# Patient Record
Sex: FEMALE | Race: OTHER
[De-identification: names, ages, dates, MRNs, and addresses within clinical notes are randomized per-mention and may not be internally consistent; named-entity substitution may affect disease eponyms.]

---

## 2019-11-27 ENCOUNTER — APPOINTMENT (OUTPATIENT)
Dept: PULMONOLOGY | Facility: CLINIC | Age: 29
End: 2019-11-27
Payer: MEDICARE

## 2019-11-27 VITALS
OXYGEN SATURATION: 100 % | DIASTOLIC BLOOD PRESSURE: 80 MMHG | RESPIRATION RATE: 16 BRPM | HEIGHT: 63 IN | HEART RATE: 76 BPM | BODY MASS INDEX: 33.13 KG/M2 | SYSTOLIC BLOOD PRESSURE: 123 MMHG | WEIGHT: 187 LBS

## 2019-11-27 PROCEDURE — 99203 OFFICE O/P NEW LOW 30 MIN: CPT

## 2019-11-27 NOTE — REVIEW OF SYSTEMS
[Fever] : no fever [Nasal Congestion] : nasal congestion [Sinus Problems] : no sinus problems [Cough] : no cough [Dyspnea] : no dyspnea [Hypertension] : ~T hypertension [Heartburn] : no heartburn [Reflux] : no reflux

## 2019-11-27 NOTE — HISTORY OF PRESENT ILLNESS
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  She was titrated to Bilevel 20/16 using Resmed P10 nasal pillows that alleviated snoring and apneas.  She presents for evaluation prior to initiating BIPAP therapy.\par \par She has a history of cognitive deficits.  She reports snoring daytime somnolence and unrestful sleep.  Her mother is present and showed me a video of her daughter sleeping in  which apneas and snoring was evident.\par \par \par PMH\par \par HTN\par \par elevated BMI\par \par disabled due to cognitive deficits\par \par \par PSH\par \par eye surgery several times due to strabismus\par \par HTN\par \par ALLERGY:  NKDA\par \par SH\par \par never smoked\par \par

## 2019-11-27 NOTE — DISCUSSION/SUMMARY
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, elevated BMI, some kyphosis and increased neck circumference.\par \par Sleep study with only titration portion was provided \par \par BIPAP 20/16 through nasal pillows recommended\par \par I am concerned that she will not tolerate such high BIPAP pressures\par \par PLAN\par check [place] CT sinus or head to evaluate for nasal polyps, septal deviation or other  correctable conditions that could enable use of lower pressures\par \par For now, will order BIPAP and observe\par \par refer to ENT for evaluation after sinus CT\par \par consider alternate mask, if appropriate seal can be attained\par \par I will need to obtain initial diagnostic sleep study results.\par \par Bobby Pickard MD [facility] \par \par

## 2019-11-27 NOTE — PHYSICAL EXAM
[Enlarged Base of the Tongue] : enlargement of the base of the tongue [Retrognathia] : retrognathia [Micrognathia] : micrognathia [Heart Rate And Rhythm] : heart rate and rhythm were normal [Heart Sounds] : normal S1 and S2 [Murmurs] : no murmurs present [Arterial Pulses Normal] : the arterial pulses were normal [Respiration, Rhythm And Depth] : normal respiratory rhythm and effort [Exaggerated Use Of Accessory Muscles For Inspiration] : no accessory muscle use [Auscultation Breath Sounds / Voice Sounds] : lungs were clear to auscultation bilaterally [Bowel Sounds] : normal bowel sounds [Abdomen Soft] : soft [Abdomen Tenderness] : non-tender [] : no hepato-splenomegaly [Nail Clubbing] : no clubbing of the fingernails [Motor Exam] : the motor exam was normal [Affect] : the affect was normal [Mood] : the mood was normal [Normal Oropharynx] : abnormal oropharynx [Low Lying Soft Palate] : no low lying soft palate [Elongated Uvula] : no elongated uvula [FreeTextEntry1] : no edema

## 2019-12-12 ENCOUNTER — APPOINTMENT (OUTPATIENT)
Dept: OTOLARYNGOLOGY | Facility: CLINIC | Age: 29
End: 2019-12-12
Payer: MEDICARE

## 2019-12-12 VITALS
BODY MASS INDEX: 32.43 KG/M2 | SYSTOLIC BLOOD PRESSURE: 135 MMHG | HEIGHT: 63 IN | DIASTOLIC BLOOD PRESSURE: 84 MMHG | WEIGHT: 183 LBS | HEART RATE: 75 BPM

## 2019-12-12 DIAGNOSIS — H61.23 IMPACTED CERUMEN, BILATERAL: ICD-10-CM

## 2019-12-12 PROCEDURE — 99204 OFFICE O/P NEW MOD 45 MIN: CPT | Mod: 25

## 2019-12-12 PROCEDURE — 69210 REMOVE IMPACTED EAR WAX UNI: CPT

## 2019-12-12 PROCEDURE — 31575 DIAGNOSTIC LARYNGOSCOPY: CPT

## 2019-12-12 RX ORDER — BRIMONIDINE TARTRATE, TIMOLOL MALEATE 2; 5 MG/ML; MG/ML
SOLUTION/ DROPS OPHTHALMIC
Refills: 0 | Status: ACTIVE | COMMUNITY

## 2019-12-12 RX ORDER — BIMATOPROST 0.1 MG/ML
SOLUTION/ DROPS OPHTHALMIC
Refills: 0 | Status: ACTIVE | COMMUNITY

## 2019-12-12 RX ORDER — LOSARTAN POTASSIUM 50 MG/1
50 TABLET, FILM COATED ORAL
Refills: 0 | Status: ACTIVE | COMMUNITY

## 2019-12-12 RX ORDER — NETARSUDIL 0.2 MG/ML
SOLUTION/ DROPS OPHTHALMIC; TOPICAL
Refills: 0 | Status: ACTIVE | COMMUNITY

## 2019-12-12 NOTE — REASON FOR VISIT
[Initial Consultation] : an initial consultation for [Parent] : parent [FreeTextEntry2] : referred by Dr. Bobby Pickard, Pulmonologist, for obstructive sleep apnea

## 2019-12-12 NOTE — HISTORY OF PRESENT ILLNESS
[de-identified] : 29 year old female, mentally disabled, referred by Dr. Bobby Pickard, Pulmonologist, for obstructive sleep apnea  Sleep study conducted 9/28/19 AHI 79.8.  Mother states she has not yet started using a CPAP machine.   Denies nasal congestion, sinus pain/pressure, post nasal drip, nasal discharge.  Denies sinus infections in the past year.

## 2019-12-12 NOTE — CONSULT LETTER
[Consult Letter:] : I had the pleasure of evaluating your patient, [unfilled]. [Dear  ___] : Dear  [unfilled], [Please see my note below.] : Please see my note below. [Sincerely,] : Sincerely, [Consult Closing:] : Thank you very much for allowing me to participate in the care of this patient.  If you have any questions, please do not hesitate to contact me. [FreeTextEntry3] : Han Mason MD, FACS \par  of Otolaryngology  \par [facility] \par [address] \[place] [address] \par Phone: [phone] \par Fax: [phone] \par \par

## 2019-12-12 NOTE — PROCEDURE
[Cerumen Impaction] : Cerumen Impaction [de-identified] : Fiberoptic Laryngoscopy (23171)\par \par Procedure performed: Fiberoptic Laryngeal Endoscopy - Diagnostic\par Pre-op/post op indication: gladys\par Patient was unable to cooperate with mirror. After informed verbal consent is obtained, the fiberoptic nasal endoscope # []  is passed via the right and left nasal cavity. \par Findings: base of tongue large retroflexed epiglottis, bilateral vocal fold mobility full and symmetric. There was insignificant arytenoids edema and  erythema seen , without  mass or lesions..Nasopharynx open , tonsils not obstructive.\par  [FreeTextEntry6] : Cerumen was removed from both ears under binocular microscopy with a combination of a suction and/or a loop curette. The patient tolerated the procedure well and there were no complications. The  findings are noted above.\par  [] : Removal of Cerumen [Same] : same as the Pre Op Dx.

## 2020-01-08 ENCOUNTER — APPOINTMENT (OUTPATIENT)
Dept: PULMONOLOGY | Facility: CLINIC | Age: 30
End: 2020-01-08
Payer: MEDICARE

## 2020-01-08 VITALS
SYSTOLIC BLOOD PRESSURE: 122 MMHG | WEIGHT: 192 LBS | DIASTOLIC BLOOD PRESSURE: 70 MMHG | HEART RATE: 80 BPM | OXYGEN SATURATION: 99 % | BODY MASS INDEX: 34.01 KG/M2 | TEMPERATURE: 97.2 F

## 2020-01-08 PROCEDURE — 99214 OFFICE O/P EST MOD 30 MIN: CPT

## 2020-01-12 NOTE — REVIEW OF SYSTEMS
[Fever] : no fever [Nasal Congestion] : nasal congestion [Sinus Problems] : no sinus problems [Dry Mouth] : no dry mouth [Cough] : no cough [Dyspnea] : no dyspnea [Hypertension] : ~T hypertension [Heartburn] : no heartburn [Reflux] : no reflux

## 2020-01-12 NOTE — HISTORY OF PRESENT ILLNESS
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  She was titrated to Bilevel 20/16 using Resmed P10 nasal pillows that alleviated snoring and apneas. \par \par She underwent CT brain, sinuses normal.  ENT evaluation noted\par \par \par Sheis using BIlevel via nasal mask, Airfit N20 nasal.\par \par She uses BIPAP most of night approximately 7 hours, and notes much benefit\par \par She has a history of cognitive deficits.  She reports snoring daytime somnolence and unrestful sleep.  \par \par PMH\par \par HTN\par \par elevated BMI\par \par disabled due to cognitive deficits\par \par \par PSH\par \par eye surgery several times due to strabismus\par \par HTN\par \par ALLERGY:  NKDA\par \par SH\par \par never smoked\par \par

## 2020-01-12 NOTE — DISCUSSION/SUMMARY
[FreeTextEntry1] : JAIME:  continue to use BILEVEL via nasal mask\par \par she declines influenza vaccine\par \par Requests letter for airline explaining that she requires MASK and device to be carried on to plane. \par \par Bobby Pickard MD [facility]

## 2020-01-12 NOTE — PHYSICAL EXAM
[Enlarged Base of the Tongue] : enlargement of the base of the tongue [Retrognathia] : retrognathia [Micrognathia] : micrognathia [Heart Rate And Rhythm] : heart rate and rhythm were normal [Arterial Pulses Normal] : the arterial pulses were normal [Heart Sounds] : normal S1 and S2 [Murmurs] : no murmurs present [Exaggerated Use Of Accessory Muscles For Inspiration] : no accessory muscle use [Auscultation Breath Sounds / Voice Sounds] : lungs were clear to auscultation bilaterally [Respiration, Rhythm And Depth] : normal respiratory rhythm and effort [Abdomen Tenderness] : non-tender [Bowel Sounds] : normal bowel sounds [Abdomen Soft] : soft [] : no hepato-splenomegaly [Motor Exam] : the motor exam was normal [Affect] : the affect was normal [Nail Clubbing] : no clubbing of the fingernails [Mood] : the mood was normal [Normal Oropharynx] : abnormal oropharynx [Low Lying Soft Palate] : no low lying soft palate [Elongated Uvula] : no elongated uvula [FreeTextEntry1] : no edema

## 2020-05-27 ENCOUNTER — APPOINTMENT (OUTPATIENT)
Dept: PULMONOLOGY | Facility: CLINIC | Age: 30
End: 2020-05-27
Payer: MEDICARE

## 2020-05-27 VITALS
TEMPERATURE: 98.3 F | OXYGEN SATURATION: 100 % | BODY MASS INDEX: 31.53 KG/M2 | HEART RATE: 79 BPM | DIASTOLIC BLOOD PRESSURE: 80 MMHG | SYSTOLIC BLOOD PRESSURE: 110 MMHG | WEIGHT: 178 LBS

## 2020-05-27 PROCEDURE — 99214 OFFICE O/P EST MOD 30 MIN: CPT

## 2020-05-27 NOTE — PHYSICAL EXAM
[III] : Mallampati Class: III [No Neck Mass] : no neck mass [No Murmurs] : no murmurs [Normal S1, S2] : normal s1, s2 [Normal Rate/Rhythm] : normal rate/rhythm [Clear to Auscultation Bilaterally] : clear to auscultation bilaterally [No Resp Distress] : no resp distress [No Clubbing] : no clubbing [Benign] : benign [Oriented x3] : oriented x3 [No Edema] : no edema

## 2020-05-27 NOTE — HISTORY OF PRESENT ILLNESS
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 She was titrated to Bilevel 20/16 using Resmed P10 nasal pillows that alleviated snoring and apneas. \par \par She underwent CT brain, sinuses normal. ENT evaluation noted\par \par \par She is using BIlevel via nasal mask, She has switched to the [employer] Nuance mask rather than the Airfit N20 nasal as the latter had caused  an abrasion on side of her nose. Masks are comparable. \par \par She uses BIPAP most of night and notes much benefit\par \par She has a history of cognitive deficits. She reports snoring daytime somnolence and unrestful sleep. \par \par PMH\par \par HTN\par \par elevated BMI\par \par disabled due to cognitive deficits\par \par \par PSH\par \par eye surgery several times due to strabismus\par \par HTN\par \par ALLERGY: NKDA\par \par SH\par \par never smoked\par \par t\par \par

## 2020-05-27 NOTE — DISCUSSION/SUMMARY
[FreeTextEntry1] : continue BIPAP at current setting with nasal mask of her choice\par \par she declines influenza vaccine\par \par \par \par Bobby Pickard MD [facility]

## 2020-05-27 NOTE — REVIEW OF SYSTEMS
[Cough] : no cough [Nasal Congestion] : nasal congestion [Hay Fever] : no hay fever [Dyspnea] : no dyspnea

## 2020-11-18 ENCOUNTER — APPOINTMENT (OUTPATIENT)
Dept: PULMONOLOGY | Facility: CLINIC | Age: 30
End: 2020-11-18
Payer: MEDICARE

## 2020-11-18 VITALS
RESPIRATION RATE: 17 BRPM | WEIGHT: 168 LBS | HEIGHT: 63 IN | SYSTOLIC BLOOD PRESSURE: 108 MMHG | DIASTOLIC BLOOD PRESSURE: 80 MMHG | HEART RATE: 97 BPM | TEMPERATURE: 97 F | OXYGEN SATURATION: 100 % | BODY MASS INDEX: 29.77 KG/M2

## 2020-11-18 PROCEDURE — 99214 OFFICE O/P EST MOD 30 MIN: CPT

## 2020-11-18 NOTE — PHYSICAL EXAM
[III] : Mallampati Class: III [No Neck Mass] : no neck mass [Normal Rate/Rhythm] : normal rate/rhythm [Normal S1, S2] : normal s1, s2 [No Murmurs] : no murmurs [No Resp Distress] : no resp distress [Clear to Auscultation Bilaterally] : clear to auscultation bilaterally [Benign] : benign [No Clubbing] : no clubbing [No Edema] : no edema [Oriented x3] : oriented x3

## 2020-11-19 NOTE — HISTORY OF PRESENT ILLNESS
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 She was titrated to Bilevel 20/16 using Resmed P10 nasal pillows that alleviated snoring and apneas. \par \par She underwent CT brain, sinuses normal. ENT evaluation noted\par \par \par She is using BIlevel via nasal mask, She has switched to the [employer]iki Nuance mask rather than the Airfit N20 nasal as the latter had caused  an abrasion on side of her nose. Masks are comparable. \par \par She uses BIPAP most of night and notes much benefit.  She is tolerating it well.  She has lost weight and feels better.\par \par \par She has had the influenza vaccine this season in September\par \par PMH\par \par HTN\par \par elevated BMI\par \par disabled due to cognitive deficits\par \par \par PSH\par \par eye surgery several times due to strabismus\par \par HTN\par \par ALLERGY: NKDA\par \par SH\par \par never smoked\par \par \par \par \par \par

## 2020-11-19 NOTE — REVIEW OF SYSTEMS
[Nasal Congestion] : nasal congestion [Cough] : no cough [Dyspnea] : no dyspnea [Hay Fever] : no hay fever

## 2020-11-19 NOTE — DISCUSSION/SUMMARY
[FreeTextEntry1] : JAIME\par \par continue Bilevel PAP therapy via nasal prongs\par \par she had flu vaccine\par \par consider pneumonia vaccine\par \par may consider retitration if continues to lose weight\par \par [facility]

## 2021-05-12 ENCOUNTER — APPOINTMENT (OUTPATIENT)
Dept: PULMONOLOGY | Facility: CLINIC | Age: 31
End: 2021-05-12
Payer: MEDICARE

## 2021-05-12 VITALS
WEIGHT: 172 LBS | BODY MASS INDEX: 30.47 KG/M2 | OXYGEN SATURATION: 99 % | SYSTOLIC BLOOD PRESSURE: 120 MMHG | TEMPERATURE: 99.5 F | HEART RATE: 76 BPM | DIASTOLIC BLOOD PRESSURE: 90 MMHG

## 2021-05-12 PROCEDURE — 99213 OFFICE O/P EST LOW 20 MIN: CPT

## 2021-05-13 NOTE — DISCUSSION/SUMMARY
[FreeTextEntry1] : JAIME\par \par continue Bilevel PAP therapy via nasal mask \par \par weight has been stable\par \par she had flu vaccine\par \par consider pneumonia vaccine, next visit after COVID vaccine\par \par Bobby Pickard MD \par

## 2021-05-13 NOTE — HISTORY OF PRESENT ILLNESS
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 She was titrated to Bilevel 20/16 using Resmed P10 nasal pillows that alleviated snoring and apneas. \par \par She underwent CT brain, sinuses normal. ENT evaluation noted\par \par \par She is using BIlevel via nasal mask, She has switched to the Tiki Nuance mask rather than the Airfit N20 nasal as the latter had caused  an abrasion on side of her nose. Masks are comparable. \par \par She uses BIPAP most of night and notes much benefit.  She is tolerating it well.  She has lost weight and feels better.\par \par She has some issues with leak, when patient moves.  \par \par She allen use mask consistently every night, at least 4 hours\par \par She has had the influenza vaccine this season in September\par \par PMH\par \par HTN\par \par elevated BMI\par \par disabled due to cognitive deficits\par \par kidney problem? \par \par history of hematuria, proteinuria, now stable.\par PSH\par \par eye surgery several times due to strabismus\par \par \par \par ALLERGY: NKDA\par \par SH\par \par never smoked\par \par \par \par \par \par  [Obstructive Sleep Apnea] : obstructive sleep apnea

## 2021-09-15 ENCOUNTER — APPOINTMENT (OUTPATIENT)
Dept: PULMONOLOGY | Facility: CLINIC | Age: 31
End: 2021-09-15
Payer: MEDICARE

## 2021-09-15 VITALS
OXYGEN SATURATION: 98 % | TEMPERATURE: 98.6 F | DIASTOLIC BLOOD PRESSURE: 82 MMHG | BODY MASS INDEX: 30.47 KG/M2 | WEIGHT: 172 LBS | SYSTOLIC BLOOD PRESSURE: 116 MMHG | HEART RATE: 98 BPM

## 2021-09-15 PROCEDURE — 99213 OFFICE O/P EST LOW 20 MIN: CPT

## 2021-09-16 NOTE — HISTORY OF PRESENT ILLNESS
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 She was titrated to Bilevel 20/16 using Resmed P10 nasal pillows that alleviated snoring and apneas. \par \par She underwent CT brain, sinuses normal. ENT evaluation noted\par \par \par She is using BIlevel via nasal mask, She has switched to the Tiki Nuance mask rather than the Airfit N20 nasal as the latter had caused  an abrasion on side of her nose. Masks are comparable. \par \par She uses BIPAP most of night and notes much benefit.  She is tolerating it well.  She has lost weight and feels better.\par \par She has some issues with leak, when patient moves.  \par \par She allen use mask consistently every night, at least 4 hours with benefit.\par \par \par \par \par \par PMH\par \par HTN\par \par elevated BMI\par \par disabled due to cognitive deficits\par \par kidney problem? \par \par history of hematuria, proteinuria, now stable.\par PSH\par \par eye surgery several times due to strabismus\par \par \par \par ALLERGY: NKDA\par \par SH\par \par never smoked\par \par \par \par \par \par

## 2021-09-16 NOTE — DISCUSSION/SUMMARY
[FreeTextEntry1] : JAIME\par \par continue Bilevel PAP therapy via nasal mask \par \par weight has been stable\par \par She has had COVID vaccine\par \par may consider pneumonia vaccine\par Pt is hesitant\par \par Bobby Pickard MD \par

## 2022-01-12 ENCOUNTER — APPOINTMENT (OUTPATIENT)
Dept: PULMONOLOGY | Facility: CLINIC | Age: 32
End: 2022-01-12
Payer: MEDICARE

## 2022-01-12 VITALS
TEMPERATURE: 98.7 F | BODY MASS INDEX: 31.89 KG/M2 | HEART RATE: 101 BPM | WEIGHT: 180 LBS | SYSTOLIC BLOOD PRESSURE: 138 MMHG | DIASTOLIC BLOOD PRESSURE: 82 MMHG | OXYGEN SATURATION: 99 %

## 2022-01-12 PROCEDURE — 99214 OFFICE O/P EST MOD 30 MIN: CPT

## 2022-01-14 NOTE — HISTORY OF PRESENT ILLNESS
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 She was titrated to Bilevel 20/16 using Resmed P10 nasal pillows that alleviated snoring and apneas. \par \par She underwent CT brain, sinuses normal. ENT evaluation noted\par \par \par She is using BIlevel via nasal mask, She has switched to the Tiki Nuance mask rather than the Airfit N20 nasal as the latter had caused  an abrasion on side of her nose. Masks are comparable. \par \par She uses BIPAP most of night and notes much benefit.  She is tolerating it well.  She has lost weight and feels better.\par \par She has some issues with leak, when patient moves.  \par \par She does use mask consistently every night, at least 4 hours with benefit.\par \par Her BIPAP has been recalled.  She has severe JAIME and is dependent on the CPAP device.  She continues to use. It has been difficult to obtain new machine as most are sold out.\par \par \par PMH\par \par HTN\par \par elevated BMI\par \par disabled due to cognitive deficits\par \par kidney problem? \par \par history of hematuria, proteinuria, now stable.\par PSH\par \par eye surgery several times due to strabismus\par \par \par \par ALLERGY: NKDA\par \par SH\par \par never smoked\par \par \par \par \par \par

## 2022-01-14 NOTE — DISCUSSION/SUMMARY
[FreeTextEntry1] : JAIME\par \par continue Bilevel PAP therapy via nasal mask \par \par I will try to expedite replacement though options are limited . Option is to buy online if family can afford.  Again devices are limited and mostly out of stock.\par \par The patient has had the influenza vaccine this season. \par \par \par \par She has had COVID vaccine\par \par may consider pneumonia vaccine\par Pt is hesitant\par \par Bobby Pickard MD \par

## 2022-04-13 ENCOUNTER — APPOINTMENT (OUTPATIENT)
Dept: PULMONOLOGY | Facility: CLINIC | Age: 32
End: 2022-04-13
Payer: MEDICARE

## 2022-04-13 VITALS
SYSTOLIC BLOOD PRESSURE: 132 MMHG | OXYGEN SATURATION: 97 % | BODY MASS INDEX: 32.24 KG/M2 | DIASTOLIC BLOOD PRESSURE: 88 MMHG | HEART RATE: 83 BPM | WEIGHT: 182 LBS | TEMPERATURE: 98 F

## 2022-04-13 PROCEDURE — 99213 OFFICE O/P EST LOW 20 MIN: CPT

## 2022-04-17 NOTE — HISTORY OF PRESENT ILLNESS
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 She was titrated to Bilevel 20/16 using Resmed P10 nasal pillows that alleviated snoring and apneas. \par \par She underwent CT brain, sinuses normal. ENT evaluation noted\par \par \par She is using BIlevel via nasal mask, She has switched to the Tiki Nuance mask rather than the Airfit N20 nasal as the latter had caused  an abrasion on side of her nose. Masks are comparable. \par \par She uses BIPAP most of night and notes much benefit.  She is tolerating it well.  She has lost weight and feels better.\par \par She has some issues with leak, when patient moves.  \par \par She does use mask consistently every night, at least 4 hours with benefit.\par \par Her BIPAP has been recalled.  She has severe JAIME and is dependent on the CPAP device.  She continues to use. It has been difficult to obtain new machine as most are sold out.\par \par She continues to use BIPAP despite recall, has not been able to obtain new device\par \par \par \par \par PMH\par \par HTN\par \par elevated BMI\par \par disabled due to cognitive deficits\par \par kidney problem? \par \par history of hematuria, proteinuria, now stable.\par PSH\par \par eye surgery several times due to strabismus\par \par \par \par ALLERGY: NKDA\par \par SH\par \par never smoked\par \par \par \par \par \par

## 2022-04-17 NOTE — DISCUSSION/SUMMARY
[FreeTextEntry1] : JAIME\par \par continue Bilevel PAP therapy via nasal mask \par \par I will try to expedite replacement though options are limited . Option is to buy online if family can afford.  Again devices are limited and mostly out of stock.\par \par The patient has had the influenza vaccine this season. \par \par Pt's mother has inquired about rom study\par \par She will look into it on web site\par \par \par She has had COVID vaccine\par \par may consider pneumonia vaccine\par Pt is hesitant\par \par Bobby Pickard MD \par

## 2022-10-12 ENCOUNTER — APPOINTMENT (OUTPATIENT)
Dept: PULMONOLOGY | Facility: CLINIC | Age: 32
End: 2022-10-12

## 2022-10-12 VITALS
HEART RATE: 94 BPM | BODY MASS INDEX: 33.13 KG/M2 | TEMPERATURE: 98 F | DIASTOLIC BLOOD PRESSURE: 82 MMHG | OXYGEN SATURATION: 99 % | SYSTOLIC BLOOD PRESSURE: 116 MMHG | WEIGHT: 187 LBS

## 2022-10-12 DIAGNOSIS — Z23 ENCOUNTER FOR IMMUNIZATION: ICD-10-CM

## 2022-10-12 PROCEDURE — 90732 PPSV23 VACC 2 YRS+ SUBQ/IM: CPT

## 2022-10-12 PROCEDURE — G0009: CPT

## 2022-10-12 PROCEDURE — 99214 OFFICE O/P EST MOD 30 MIN: CPT | Mod: 25

## 2022-10-13 PROBLEM — Z23 ENCOUNTER FOR IMMUNIZATION: Status: ACTIVE | Noted: 2020-11-19

## 2022-10-13 NOTE — DISCUSSION/SUMMARY
[FreeTextEntry1] : JAIME\par \par continue Bilevel PAP therapy via nasal mask \par \par I will try to expedite replacement though options are limited . I will send prescription to other supplier in hopes of obtaining new equipment sooner\par \par The patient has had the influenza vaccine this season. \par \par She has had COVID vaccine\par \par I have administered the pneumonia vaccine today\par \par \par Total time spent : 30 minutes\par Including:\par Preparation prior to visit - Reviewing prior record, results of tests and Consultation Reports as applicable\par Conducting an appropriate H & P during today's encounter\par Appropriate orders for tests, medications and procedures, as applicable\par Counseling patient \par Note completion \par \par Bobby Pickard MD \par

## 2022-10-13 NOTE — HISTORY OF PRESENT ILLNESS
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 She was titrated to Bilevel 20/16 using Resmed P10 nasal pillows that alleviated snoring and apneas. \par \par She underwent CT brain, sinuses normal. ENT evaluation noted\par \par \par She is using BIlevel via nasal mask, She has switched to the Tiki Nuance mask rather than the Airfit N20 nasal as the latter had caused  an abrasion on side of her nose. Masks are comparable. \par \par She uses BIPAP most of night and notes much benefit.  She is tolerating it well.  She has lost weight and feels better.\par \par She has some issues with leak, when patient moves.  \par \par She does use mask consistently every night, at least 4 hours with benefit.\par \par Her BIPAP has been recalled.  She has severe JAIME and is dependent on the CPAP device.  She continues to use it. It has been difficult to obtain new machine as most are sold out.\par \par She continues to use BIPAP despite recall, has not been able to obtain new device\par \par She has not yet received replacement from [facility]. \par \par She denies dyspnea, cough, wheeze.  She gains benefit from CPAP.\par \par The patient has had the influenza vaccine this season. \par \par \par PMH\par \par HTN\par \par elevated BMI\par \par disabled due to cognitive deficits\par \par kidney problem? \par \par history of hematuria, proteinuria, now stable.\par PSH\par \par eye surgery several times due to strabismus\par \par \par \par ALLERGY: NKDA\par \par SH\par \par never smoked\par \par \par \par \par \par

## 2022-10-17 ENCOUNTER — MED ADMIN CHARGE (OUTPATIENT)
Age: 32
End: 2022-10-17

## 2023-02-15 ENCOUNTER — APPOINTMENT (OUTPATIENT)
Dept: PULMONOLOGY | Facility: CLINIC | Age: 33
End: 2023-02-15
Payer: MEDICARE

## 2023-02-15 VITALS
WEIGHT: 186 LBS | SYSTOLIC BLOOD PRESSURE: 122 MMHG | TEMPERATURE: 98.6 F | HEART RATE: 83 BPM | DIASTOLIC BLOOD PRESSURE: 84 MMHG | OXYGEN SATURATION: 99 % | BODY MASS INDEX: 32.95 KG/M2

## 2023-02-15 PROCEDURE — 99215 OFFICE O/P EST HI 40 MIN: CPT

## 2023-02-18 NOTE — DISCUSSION/SUMMARY
[FreeTextEntry1] : JAIME\par \par continue Bilevel PAP therapy via nasal mask \par \par I will try to expedite replacement though options are limited . Option is to buy online if family can afford.  Again devices are limited and mostly out of stock.\par \par I have called her supplier and requested equipmet\par \par The patient has had the influenza vaccine this season. \par \par Pt's mother has inquired about rom study\par \par She will look into it on web site\par \par \par She has had COVID vaccine\par \par may consider pneumonia vaccine\par Pt is hesitant\par \par Bobby Pickard MD \par

## 2023-02-18 NOTE — HISTORY OF PRESENT ILLNESS
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 She was titrated to Bilevel 20/16 using Resmed P10 nasal pillows that alleviated snoring and apneas. \par \par She underwent CT brain, sinuses normal. ENT evaluation noted\par \par \par She is using BIlevel via nasal mask, She has switched to the Tiki Nuance mask rather than the Airfit N20 nasal as the latter had caused  an abrasion on side of her nose. Masks are comparable. \par \par She uses BIPAP most of night and notes much benefit.  She is tolerating it well.  She has lost weight and feels better.\par \par She has some issues with leak, when patient moves.  \par \par She does use mask consistently every night, at least 4 hours with benefit.\par \par Her BIPAP has been recalled.  She has severe JAIME and is dependent on the CPAP device.  She continues to use. It has been difficult to obtain new machine as most are sold out.\par \par She continues to use BIPAP despite recall, has not been able to obtain new device\par \par Today she informs me that she has yet to receive replacement.  they have contacted [facility]\par \par \par PMH\par \par HTN\par \par elevated BMI\par \par disabled due to cognitive deficits\par \par kidney problem? \par \par history of hematuria, proteinuria, now stable.\par PSH\par \par eye surgery several times due to strabismus\par \par \par \par ALLERGY: NKDA\par \par SH\par \par never smoked\par \par \par \par \par \par

## 2023-06-14 ENCOUNTER — APPOINTMENT (OUTPATIENT)
Dept: PULMONOLOGY | Facility: CLINIC | Age: 33
End: 2023-06-14
Payer: MEDICARE

## 2023-06-14 VITALS
BODY MASS INDEX: 33.3 KG/M2 | SYSTOLIC BLOOD PRESSURE: 126 MMHG | HEART RATE: 99 BPM | DIASTOLIC BLOOD PRESSURE: 72 MMHG | OXYGEN SATURATION: 99 % | WEIGHT: 188 LBS | TEMPERATURE: 97.1 F

## 2023-06-14 DIAGNOSIS — Z00.00 ENCOUNTER FOR GENERAL ADULT MEDICAL EXAMINATION W/OUT ABNORMAL FINDINGS: ICD-10-CM

## 2023-06-14 PROCEDURE — 99214 OFFICE O/P EST MOD 30 MIN: CPT

## 2023-06-14 NOTE — PHYSICAL EXAM
[III] : Mallampati Class: III [No Neck Mass] : no neck mass [Normal Rate/Rhythm] : normal rate/rhythm [Normal S1, S2] : normal s1, s2 [No Murmurs] : no murmurs [No Resp Distress] : no resp distress [Clear to Auscultation Bilaterally] : clear to auscultation bilaterally [Benign] : benign [No Edema] : no edema [No Clubbing] : no clubbing [Oriented x3] : oriented x3

## 2023-06-14 NOTE — HISTORY OF PRESENT ILLNESS
[Never] : never [Obstructive Sleep Apnea] : obstructive sleep apnea [TextBox_4] : 32 year old woman who has been diagnosed with severe obstructive sleep apnea\par \par [facility] demonstrated severe JAIME with AHI 79.8. She was titrated to Bilevel 20/16 using Resmed P10 nasal pillows that alleviated snoring and apneas. \par \par She underwent CT brain, sinuses normal. ENT evaluation noted\par \par \par She is using BIlevel via nasal mask, She has switched to the [facility]iki Nuance mask rather than the Airfit N20 nasal as the latter had caused  an abrasion on side of her nose. Masks are comparable. \par \par She uses BIPAP most of night and notes much benefit.  She is tolerating it well.  She has lost weight and feels better.\par \par She has some issues with leak, when patient moves.  \par \par She does use mask consistently every night, at least 4 hours with benefit.\par \par Her BIPAP has been recalled.  She has severe JAIME and is dependent on the CPAP device.  She continues to use. It has been difficult to obtain new machine as most are sold out.\par \par She continues to use BIPAP despite recall, has not been able to obtain new device\par \par Today she informs me that she has yet to receive replacement.  they have contacted [facility]\par \par \par PMH\par \par HTN\par \par elevated BMI\par \par disabled due to cognitive deficits\par \par kidney problem? \par \par history of hematuria, proteinuria, now stable.\par PSH\par \par eye surgery several times due to strabismus\par \par \par \par ALLERGY: NKDA\par \par SH\par \par never smoked\par \par \par \par \par \par

## 2023-06-14 NOTE — DISCUSSION/SUMMARY
[FreeTextEntry1] : JAIME\par \par continue Bilevel PAP therapy via nasal pillows now\par \par Has obtained replacement \par \par She is breathing well.  She uses PAP therapy every night\par \par The patient has had the influenza vaccine this season. \par We will obtain chest x-ray\par \par She has had COVID vaccine\par \par may consider pneumonia vaccine\par Pt is hesitant\par \par Bobby Pickard MD \par

## 2023-12-13 ENCOUNTER — APPOINTMENT (OUTPATIENT)
Dept: PULMONOLOGY | Facility: CLINIC | Age: 33
End: 2023-12-13
Payer: MEDICARE

## 2023-12-13 VITALS
WEIGHT: 184 LBS | SYSTOLIC BLOOD PRESSURE: 108 MMHG | TEMPERATURE: 98.2 F | BODY MASS INDEX: 32.59 KG/M2 | OXYGEN SATURATION: 99 % | DIASTOLIC BLOOD PRESSURE: 84 MMHG | HEART RATE: 88 BPM

## 2023-12-13 PROCEDURE — 99214 OFFICE O/P EST MOD 30 MIN: CPT

## 2023-12-13 NOTE — DISCUSSION/SUMMARY
[FreeTextEntry1] : JAIME   She is doing well on Bilevel PAP therapy via nasal pillows now  Has obtained replacement from [facility].  However, her DME is [facility] and will receive the device from them. Subsequently return current device to [facility]  has not received tubing and new mask from [facility].  My office has been working on this  She is breathing well.  She uses BIPAP therapy every night  I have ordered new tubing and mask.  She will continue her current BIPAP until it is fully replaced  The patient has had the influenza vaccine this season.  She did obtain chest x-ray  which was  unremarkable   She has had COVID vaccine  may consider pneumonia vaccine Pt is hesitant  Bobby Pickard MD

## 2023-12-13 NOTE — HISTORY OF PRESENT ILLNESS
[TextBox_4] : 32 year old woman who has been diagnosed with severe obstructive sleep apnea\par  \par  [facility] demonstrated severe JAIME with AHI 79.8. She was titrated to Bilevel 20/16 using Resmed P10 nasal pillows that alleviated snoring and apneas. \par  \par  She underwent CT brain, sinuses normal. ENT evaluation noted\par  \par  \par  She is using BIlevel via nasal mask, She has switched to the Tiki Nuance mask rather than the Airfit N20 nasal as the latter had caused  an abrasion on side of her nose. Masks are comparable. \par  \par  She uses BIPAP most of night and notes much benefit.  She is tolerating it well.  She has lost weight and feels better.\par  \par  She has some issues with leak, when patient moves.  \par  \par  She does use mask consistently every night, at least 4 hours with benefit.\par  \par  Her BIPAP has been recalled.  She has severe JAIME and is dependent on the CPAP device.  She continues to use. It has been difficult to obtain new machine as most are sold out.\par  \par  She continues to use BIPAP despite recall, has not been able to obtain new device\par  \par  Today she informs me that she has yet to receive replacement.  they have contacted [facility]\par  \par  \par  PMH\par  \par  HTN\par  \par  elevated BMI\par  \par  disabled due to cognitive deficits\par  \par  kidney problem? \par  \par  history of hematuria, proteinuria, now stable.\par  PSH\par  \par  eye surgery several times due to strabismus\par  \par  \par  \par  ALLERGY: NKDA\par  \par  SH\par  \par  never smoked\par  \par  \par  \par  \par  \par

## 2024-03-10 NOTE — PHYSICAL EXAM
[III] : Mallampati Class: III [No Neck Mass] : no neck mass [Normal Rate/Rhythm] : normal rate/rhythm [Normal S1, S2] : normal s1, s2 [No Murmurs] : no murmurs [No Resp Distress] : no resp distress [Benign] : benign [Clear to Auscultation Bilaterally] : clear to auscultation bilaterally [No Clubbing] : no clubbing [No Edema] : no edema [Oriented x3] : oriented x3

## 2024-03-25 ENCOUNTER — APPOINTMENT (OUTPATIENT)
Dept: PULMONOLOGY | Facility: CLINIC | Age: 34
End: 2024-03-25
Payer: MEDICARE

## 2024-03-25 VITALS
TEMPERATURE: 97.3 F | SYSTOLIC BLOOD PRESSURE: 108 MMHG | BODY MASS INDEX: 31 KG/M2 | OXYGEN SATURATION: 99 % | HEART RATE: 69 BPM | WEIGHT: 175 LBS | DIASTOLIC BLOOD PRESSURE: 82 MMHG

## 2024-03-25 DIAGNOSIS — G47.33 OBSTRUCTIVE SLEEP APNEA (ADULT) (PEDIATRIC): ICD-10-CM

## 2024-03-25 PROCEDURE — 99215 OFFICE O/P EST HI 40 MIN: CPT

## 2024-03-25 NOTE — HISTORY OF PRESENT ILLNESS
[Obstructive Sleep Apnea] : obstructive sleep apnea [Never] : never [TextBox_4] : 32 year old woman who has been diagnosed with severe obstructive sleep apnea  [facility] that demonstrated severe JAIME with AHI 79.8. She was titrated to Bilevel 20/16 using Resmed P10 nasal pillows that alleviated snoring and apneas.   She underwent CT brain, sinuses normal. ENT evaluation noted   She is using BIlevel via nasal mask, She has switched to the Tiki Nuance mask rather than the Airfit N20 nasal as the latter had caused  an abrasion on side of her nose. Masks are comparable.   She uses BIPAP most of night and notes much benefit.  She is tolerating it well.  She has lost weight and feels better.  She has some issues with leak, when patient moves.    She does use mask consistently every night, at least 4 hours with benefit.  Her BIPAP has been recalled.  She has severe JAIME and is dependent on the CPAP device.  She continues to use. It has been difficult to obtain new machine as most are sold out.  She continues to use BIPAP despite recall, has not been able to obtain new device  Today she informs me that she has yet to receive replacement.  they have contacted [facility]  PMD [facility]  [phone]  [facility]  HTN  elevated BMI  disabled due to cognitive deficits  kidney problem?   history of hematuria, proteinuria, now stable. H  eye surgery several times due to strabismus    ALLERGY: LEROY  SH  never smoked

## 2024-03-25 NOTE — DISCUSSION/SUMMARY
[FreeTextEntry1] : JAIME   She is doing well on Bilevel PAP therapy via nasal pillows now    Has obtained replacement from [facility].  However, her DME is [facility] and will receive the device from them. Subsequently return current device to [facility]   She is breathing well.  She uses BIPAP therapy every night  I have ordered new tubing and mask.  She has received new equipment from [facility], as well as receiving supplies. She will continue her current BIPAP settings  Will request compliance report  She had pharyngitis, now improved  The patient has had the influenza vaccine this season.  She did obtain chest x-ray  which was  unremarkable   She has had COVID vaccine  Has obtained pneumonia vaccine   Total time spent : 30 minutes Including: Preparation prior to visit - Reviewing prior record, results of tests and Consultation Reports as applicable Conducting an appropriate H & P during today's encounter Appropriate orders for tests, medications and procedures, as applicable Counseling patient  Note completion   Bobby Pickard MD

## 2024-09-22 ENCOUNTER — NON-APPOINTMENT (OUTPATIENT)
Age: 34
End: 2024-09-22

## 2024-09-23 ENCOUNTER — APPOINTMENT (OUTPATIENT)
Dept: PULMONOLOGY | Facility: CLINIC | Age: 34
End: 2024-09-23
Payer: MEDICARE

## 2024-09-23 VITALS
SYSTOLIC BLOOD PRESSURE: 118 MMHG | WEIGHT: 178 LBS | OXYGEN SATURATION: 99 % | HEART RATE: 101 BPM | BODY MASS INDEX: 31.53 KG/M2 | TEMPERATURE: 97.9 F | DIASTOLIC BLOOD PRESSURE: 82 MMHG

## 2024-09-23 DIAGNOSIS — G47.33 OBSTRUCTIVE SLEEP APNEA (ADULT) (PEDIATRIC): ICD-10-CM

## 2024-09-23 DIAGNOSIS — Z23 ENCOUNTER FOR IMMUNIZATION: ICD-10-CM

## 2024-09-23 PROCEDURE — G0008: CPT

## 2024-09-23 PROCEDURE — 90686 IIV4 VACC NO PRSV 0.5 ML IM: CPT

## 2024-09-23 PROCEDURE — 99213 OFFICE O/P EST LOW 20 MIN: CPT | Mod: 25

## 2024-09-23 NOTE — DISCUSSION/SUMMARY
[FreeTextEntry1] : JAIME   She is doing well on Bilevel PAP therapy via nasal pillows now    Has obtained replacement from [facility].  However, her DME is [facility] and will receive the device from them. Subsequently return current device to [facility]   She is breathing well.  She uses BIPAP therapy every night  I have ordered new tubing and mask.  She has received new equipment from [facility], as well as receiving supplies. She will continue her current BIPAP settings  Will request compliance report  She had pharyngitis, now improved  The patient has had the influenza vaccine this season.  She did obtain chest x-ray  which was  unremarkable   She has had COVID vaccine  Has obtained pneumonia vaccine  needs compliance report  given influenza vaccine   Total time spent : 25 minutes Including: Preparation prior to visit - Reviewing prior record, results of tests and Consultation Reports as applicable Conducting an appropriate H & P during today's encounter Appropriate orders for tests, medications and procedures, as applicable Counseling patient  Note completion   Bobby Pickard MD

## 2024-09-23 NOTE — DISCUSSION/SUMMARY
[FreeTextEntry1] : JAIME   She is doing well on Bilevel PAP therapy via nasal pillows now    Has obtained replacement from [facility].  However, her DME is [facility] and will receive the device from them. Subsequently return current device to [facility]   She is breathing well.  She uses BIPAP therapy every night  I have ordered new tubing and mask.  She has received new equipment from [facility], as well as receiving supplies. She will continue her current BIPAP settings  Will request compliance report  She had pharyngitis, now improved  The patient has had the influenza vaccine this season.  She did obtain chest x-ray  which was  unremarkable   She has had COVID vaccine  Has obtained pneumonia vaccine  needs compliance report  given influenza vaccine   Total time spent : 25 minutes Including: Preparation prior to visit - Reviewing prior record, results of tests and Consultation Reports as applicable Conducting an appropriate H & P during today's encounter Appropriate orders for tests, medications and procedures, as applicable Counseling patient  Note completion   Bobby Pickard MD

## 2024-09-23 NOTE — HISTORY OF PRESENT ILLNESS
[Never] : never [Obstructive Sleep Apnea] : obstructive sleep apnea [TextBox_4] : 32 year old woman who has been diagnosed with severe obstructive sleep apnea  [facility] that demonstrated severe JAIME with AHI 79.8. She was titrated to Bilevel 20/16 using Resmed P10 nasal pillows that alleviated snoring and apneas.   She underwent CT brain, sinuses normal. ENT evaluation noted   She is using BIlevel via nasal mask, She has switched to the Tiki Nuance mask rather than the Airfit N20 nasal as the latter had caused  an abrasion on side of her nose. Masks are comparable.   She uses BIPAP most of night and notes much benefit.  She is tolerating it well.  She has lost weight and feels better.  She has some issues with leak, when patient moves.    She does use mask consistently every night, at least 4 hours with benefit.  Her BIPAP has been recalled.  She has severe JAIME and is dependent on the CPAP device.  She continues to use. It has been difficult to obtain new machine as most are sold out.  She continues to use BIPAP despite recall, has not been able to obtain new device  Today she informs me that she has yet to receive replacement.  they have contacted [facility]  PMD [facility]  [phone]  [facility]  HTN  elevated BMI  disabled due to cognitive deficits  kidney problem?   history of hematuria, proteinuria, now stable. H  eye surgery several times due to strabismus    ALLERGY: LEROY  SH  never smoked

## 2024-09-23 NOTE — HISTORY OF PRESENT ILLNESS
[Never] : never [Obstructive Sleep Apnea] : obstructive sleep apnea [TextBox_4] : 32 year old woman who has been diagnosed with severe obstructive sleep apnea  [facility] that demonstrated severe JAIME with AHI 79.8. She was titrated to Bilevel 20/16 using Resmed P10 nasal pillows that alleviated snoring and apneas.   She underwent CT brain, sinuses normal. ENT evaluation noted   She is using BIlevel via nasal mask, She has switched to the [facility]iki Nuance mask rather than the Airfit N20 nasal as the latter had caused  an abrasion on side of her nose. Masks are comparable.   She uses BIPAP most of night and notes much benefit.  She is tolerating it well.  She has lost weight and feels better.  She has some issues with leak, when patient moves.    She does use mask consistently every night, at least 4 hours with benefit.  Her BIPAP has been recalled.  She has severe JAIME and is dependent on the CPAP device.  She continues to use. It has been difficult to obtain new machine as most are sold out.  She continues to use BIPAP despite recall, has not been able to obtain new device  Today she informs me that she has yet to receive replacement.  they have contacted [facility]  PMD [facility]  [phone]  [facility]  HTN  elevated BMI  disabled due to cognitive deficits  kidney problem?   history of hematuria, proteinuria, now stable. H  eye surgery several times due to strabismus    ALLERGY: LEROY  SH  never smoked

## 2024-09-23 NOTE — DISCUSSION/SUMMARY
[FreeTextEntry1] : JAIME   She is doing well on Bilevel PAP therapy via nasal pillows now    Has obtained replacement from [facility].  However, her DME is [facility] and will receive the device from them. Subsequently return current device to [facility]   She is breathing well.  She uses BIPAP therapy every night  I have ordered new tubing and mask.  She has received new equipment from [facility], as well as receiving supplies. She will continue her current BIPAP settings  Will request compliance report  She had pharyngitis, now improved  The patient has had the influenza vaccine this season.  She did obtain chest x-ray  which was  unremarkable   She has had COVID vaccine  Has obtained pneumonia vaccine  needs compliance report  given influenza vaccine   Total time spent : 25 minutes Including: Preparation prior to visit - Reviewing prior record, results of tests and Consultation Reports as applicable Conducting an appropriate H & P during today's encounter Appropriate orders for tests, medications and procedures, as applicable Counseling patient  Note completion   Bobby Pickard MD

## 2024-09-23 NOTE — HISTORY OF PRESENT ILLNESS
[Never] : never [Obstructive Sleep Apnea] : obstructive sleep apnea [TextBox_4] : 32 year old woman who has been diagnosed with severe obstructive sleep apnea  [facility] that demonstrated severe JAIME with AHI 79.8. She was titrated to Bilevel 20/16 using Resmed P10 nasal pillows that alleviated snoring and apneas.   She underwent CT brain, sinuses normal. ENT evaluation noted   She is using BIlevel via nasal mask, She has switched to the [facility]iki Nuance mask rather than the Airfit N20 nasal as the latter had caused  an abrasion on side of her nose. Masks are comparable.   She uses BIPAP most of night and notes much benefit.  She is tolerating it well.  She has lost weight and feels better.  She has some issues with leak, when patient moves.    She does use mask consistently every night, at least 4 hours with benefit.  Her BIPAP has been recalled.  She has severe JAIME and is dependent on the CPAP device.  She continues to use. It has been difficult to obtain new machine as most are sold out.  She continues to use BIPAP despite recall, has not been able to obtain new device  Today she informs me that she has yet to receive replacement.  they have contacted [facility]  PMD [facility]  [phone]  [facility]  HTN  elevated BMI  disabled due to cognitive deficits  kidney problem?   history of hematuria, proteinuria, now stable. H  eye surgery several times due to strabismus    ALLERGY: LEROY  SH  never smoked

## 2025-04-07 ENCOUNTER — APPOINTMENT (OUTPATIENT)
Dept: PULMONOLOGY | Facility: CLINIC | Age: 35
End: 2025-04-07
Payer: MEDICARE

## 2025-04-07 ENCOUNTER — NON-APPOINTMENT (OUTPATIENT)
Age: 35
End: 2025-04-07

## 2025-04-07 VITALS
DIASTOLIC BLOOD PRESSURE: 74 MMHG | TEMPERATURE: 97.9 F | HEIGHT: 63 IN | WEIGHT: 167 LBS | HEART RATE: 77 BPM | OXYGEN SATURATION: 98 % | BODY MASS INDEX: 29.59 KG/M2 | SYSTOLIC BLOOD PRESSURE: 120 MMHG

## 2025-04-07 DIAGNOSIS — G47.33 OBSTRUCTIVE SLEEP APNEA (ADULT) (PEDIATRIC): ICD-10-CM

## 2025-04-07 PROCEDURE — 99214 OFFICE O/P EST MOD 30 MIN: CPT
